# Patient Record
Sex: FEMALE | Race: WHITE | NOT HISPANIC OR LATINO | Employment: OTHER | URBAN - METROPOLITAN AREA
[De-identification: names, ages, dates, MRNs, and addresses within clinical notes are randomized per-mention and may not be internally consistent; named-entity substitution may affect disease eponyms.]

---

## 2023-03-25 SDOH — ECONOMIC STABILITY: HOUSING INSECURITY: IN THE LAST 12 MONTHS, HOW MANY PLACES HAVE YOU LIVED?: 1

## 2023-03-25 SDOH — ECONOMIC STABILITY: INCOME INSECURITY: IN THE LAST 12 MONTHS, WAS THERE A TIME WHEN YOU WERE NOT ABLE TO PAY THE MORTGAGE OR RENT ON TIME?: NO

## 2023-03-25 SDOH — HEALTH STABILITY: PHYSICAL HEALTH: ON AVERAGE, HOW MANY MINUTES DO YOU ENGAGE IN EXERCISE AT THIS LEVEL?: 0 MIN

## 2023-03-25 SDOH — ECONOMIC STABILITY: FOOD INSECURITY: WITHIN THE PAST 12 MONTHS, THE FOOD YOU BOUGHT JUST DIDN'T LAST AND YOU DIDN'T HAVE MONEY TO GET MORE.: NEVER TRUE

## 2023-03-25 SDOH — ECONOMIC STABILITY: HOUSING INSECURITY
IN THE LAST 12 MONTHS, WAS THERE A TIME WHEN YOU DID NOT HAVE A STEADY PLACE TO SLEEP OR SLEPT IN A SHELTER (INCLUDING NOW)?: NO

## 2023-03-25 SDOH — ECONOMIC STABILITY: INCOME INSECURITY: HOW HARD IS IT FOR YOU TO PAY FOR THE VERY BASICS LIKE FOOD, HOUSING, MEDICAL CARE, AND HEATING?: NOT HARD AT ALL

## 2023-03-25 SDOH — HEALTH STABILITY: PHYSICAL HEALTH: ON AVERAGE, HOW MANY DAYS PER WEEK DO YOU ENGAGE IN MODERATE TO STRENUOUS EXERCISE (LIKE A BRISK WALK)?: 0 DAYS

## 2023-03-25 SDOH — ECONOMIC STABILITY: TRANSPORTATION INSECURITY
IN THE PAST 12 MONTHS, HAS THE LACK OF TRANSPORTATION KEPT YOU FROM MEDICAL APPOINTMENTS OR FROM GETTING MEDICATIONS?: NO

## 2023-03-25 SDOH — ECONOMIC STABILITY: TRANSPORTATION INSECURITY
IN THE PAST 12 MONTHS, HAS LACK OF TRANSPORTATION KEPT YOU FROM MEETINGS, WORK, OR FROM GETTING THINGS NEEDED FOR DAILY LIVING?: NO

## 2023-03-25 SDOH — ECONOMIC STABILITY: FOOD INSECURITY: WITHIN THE PAST 12 MONTHS, YOU WORRIED THAT YOUR FOOD WOULD RUN OUT BEFORE YOU GOT MONEY TO BUY MORE.: NEVER TRUE

## 2023-03-25 SDOH — ECONOMIC STABILITY: TRANSPORTATION INSECURITY
IN THE PAST 12 MONTHS, HAS LACK OF RELIABLE TRANSPORTATION KEPT YOU FROM MEDICAL APPOINTMENTS, MEETINGS, WORK OR FROM GETTING THINGS NEEDED FOR DAILY LIVING?: NO

## 2023-03-25 SDOH — HEALTH STABILITY: MENTAL HEALTH
STRESS IS WHEN SOMEONE FEELS TENSE, NERVOUS, ANXIOUS, OR CAN'T SLEEP AT NIGHT BECAUSE THEIR MIND IS TROUBLED. HOW STRESSED ARE YOU?: PATIENT DECLINED

## 2023-03-25 ASSESSMENT — SOCIAL DETERMINANTS OF HEALTH (SDOH)
DO YOU BELONG TO ANY CLUBS OR ORGANIZATIONS SUCH AS CHURCH GROUPS UNIONS, FRATERNAL OR ATHLETIC GROUPS, OR SCHOOL GROUPS?: NO
DO YOU BELONG TO ANY CLUBS OR ORGANIZATIONS SUCH AS CHURCH GROUPS UNIONS, FRATERNAL OR ATHLETIC GROUPS, OR SCHOOL GROUPS?: NO
HOW OFTEN DO YOU ATTENT MEETINGS OF THE CLUB OR ORGANIZATION YOU BELONG TO?: PATIENT DECLINED
ARE YOU MARRIED, WIDOWED, DIVORCED, SEPARATED, NEVER MARRIED, OR LIVING WITH A PARTNER?: LIVING WITH PARTNER
IN A TYPICAL WEEK, HOW MANY TIMES DO YOU TALK ON THE PHONE WITH FAMILY, FRIENDS, OR NEIGHBORS?: PATIENT DECLINED
IN A TYPICAL WEEK, HOW MANY TIMES DO YOU TALK ON THE PHONE WITH FAMILY, FRIENDS, OR NEIGHBORS?: PATIENT DECLINED
HOW OFTEN DO YOU ATTEND CHURCH OR RELIGIOUS SERVICES?: PATIENT DECLINED
HOW OFTEN DO YOU GET TOGETHER WITH FRIENDS OR RELATIVES?: PATIENT DECLINED
ARE YOU MARRIED, WIDOWED, DIVORCED, SEPARATED, NEVER MARRIED, OR LIVING WITH A PARTNER?: LIVING WITH PARTNER
HOW OFTEN DO YOU ATTENT MEETINGS OF THE CLUB OR ORGANIZATION YOU BELONG TO?: PATIENT DECLINED
WITHIN THE PAST 12 MONTHS, YOU WORRIED THAT YOUR FOOD WOULD RUN OUT BEFORE YOU GOT THE MONEY TO BUY MORE: NEVER TRUE
HOW OFTEN DO YOU HAVE A DRINK CONTAINING ALCOHOL: MONTHLY OR LESS
HOW OFTEN DO YOU HAVE SIX OR MORE DRINKS ON ONE OCCASION: NEVER
HOW MANY DRINKS CONTAINING ALCOHOL DO YOU HAVE ON A TYPICAL DAY WHEN YOU ARE DRINKING: 1 OR 2
HOW OFTEN DO YOU GET TOGETHER WITH FRIENDS OR RELATIVES?: PATIENT DECLINED
HOW HARD IS IT FOR YOU TO PAY FOR THE VERY BASICS LIKE FOOD, HOUSING, MEDICAL CARE, AND HEATING?: NOT HARD AT ALL
HOW OFTEN DO YOU ATTEND CHURCH OR RELIGIOUS SERVICES?: PATIENT DECLINED

## 2023-03-25 ASSESSMENT — LIFESTYLE VARIABLES
SKIP TO QUESTIONS 9-10: 1
HOW MANY STANDARD DRINKS CONTAINING ALCOHOL DO YOU HAVE ON A TYPICAL DAY: 1 OR 2
AUDIT-C TOTAL SCORE: 1
HOW OFTEN DO YOU HAVE SIX OR MORE DRINKS ON ONE OCCASION: NEVER
HOW OFTEN DO YOU HAVE A DRINK CONTAINING ALCOHOL: MONTHLY OR LESS

## 2023-03-28 ENCOUNTER — OFFICE VISIT (OUTPATIENT)
Dept: MEDICAL GROUP | Facility: MEDICAL CENTER | Age: 53
End: 2023-03-28
Payer: COMMERCIAL

## 2023-03-28 VITALS
OXYGEN SATURATION: 93 % | HEART RATE: 85 BPM | SYSTOLIC BLOOD PRESSURE: 135 MMHG | HEIGHT: 69 IN | RESPIRATION RATE: 17 BRPM | BODY MASS INDEX: 37.03 KG/M2 | DIASTOLIC BLOOD PRESSURE: 90 MMHG | WEIGHT: 250 LBS | TEMPERATURE: 98.2 F

## 2023-03-28 DIAGNOSIS — R06.83 SNORING: ICD-10-CM

## 2023-03-28 DIAGNOSIS — Z12.31 ENCOUNTER FOR SCREENING MAMMOGRAM FOR BREAST CANCER: ICD-10-CM

## 2023-03-28 DIAGNOSIS — I10 ESSENTIAL HYPERTENSION: ICD-10-CM

## 2023-03-28 DIAGNOSIS — Z00.00 WELL ADULT EXAM: ICD-10-CM

## 2023-03-28 DIAGNOSIS — R42 VERTIGO: ICD-10-CM

## 2023-03-28 DIAGNOSIS — Z90.710 H/O TOTAL HYSTERECTOMY: ICD-10-CM

## 2023-03-28 DIAGNOSIS — R53.82 CHRONIC FATIGUE: ICD-10-CM

## 2023-03-28 DIAGNOSIS — Z85.41 HISTORY OF CERVICAL CANCER: ICD-10-CM

## 2023-03-28 DIAGNOSIS — Z86.711 HISTORY OF PULMONARY EMBOLUS (PE): ICD-10-CM

## 2023-03-28 DIAGNOSIS — R63.5 WEIGHT GAIN: ICD-10-CM

## 2023-03-28 DIAGNOSIS — E66.9 CLASS 2 OBESITY: ICD-10-CM

## 2023-03-28 DIAGNOSIS — Z11.59 NEED FOR HEPATITIS C SCREENING TEST: ICD-10-CM

## 2023-03-28 DIAGNOSIS — Z12.11 COLON CANCER SCREENING: ICD-10-CM

## 2023-03-28 PROBLEM — Z87.442 HISTORY OF KIDNEY STONES: Status: ACTIVE | Noted: 2018-02-09

## 2023-03-28 PROBLEM — M25.561 CHRONIC PAIN OF BOTH KNEES: Status: ACTIVE | Noted: 2017-12-19

## 2023-03-28 PROBLEM — M25.562 CHRONIC PAIN OF BOTH KNEES: Status: ACTIVE | Noted: 2017-12-19

## 2023-03-28 PROBLEM — N20.0 KIDNEY STONE: Status: ACTIVE | Noted: 2018-02-09

## 2023-03-28 PROBLEM — G89.29 CHRONIC PAIN OF BOTH KNEES: Status: ACTIVE | Noted: 2017-12-19

## 2023-03-28 PROBLEM — M13.80 ALLERGIC ARTHRITIS: Status: ACTIVE | Noted: 2017-12-05

## 2023-03-28 PROCEDURE — 99204 OFFICE O/P NEW MOD 45 MIN: CPT | Performed by: INTERNAL MEDICINE

## 2023-03-28 RX ORDER — PROCHLORPERAZINE MALEATE 10 MG
TABLET ORAL
COMMUNITY
Start: 2023-03-21 | End: 2023-06-07

## 2023-03-28 RX ORDER — FLUTICASONE PROPIONATE AND SALMETEROL 250; 50 UG/1; UG/1
POWDER RESPIRATORY (INHALATION)
COMMUNITY
Start: 2023-03-21 | End: 2023-06-07

## 2023-03-28 RX ORDER — BENZONATATE 200 MG/1
CAPSULE ORAL
COMMUNITY
Start: 2023-03-21 | End: 2023-06-07

## 2023-03-28 RX ORDER — HYDROCODONE BITARTRATE AND ACETAMINOPHEN 10; 325 MG/1; MG/1
1 TABLET ORAL EVERY 6 HOURS PRN
COMMUNITY
End: 2023-03-28

## 2023-03-28 RX ORDER — DEXTROMETHORPHAN HYDROBROMIDE AND PROMETHAZINE HYDROCHLORIDE 15; 6.25 MG/5ML; MG/5ML
SYRUP ORAL
COMMUNITY
Start: 2023-03-21 | End: 2023-03-28

## 2023-03-28 RX ORDER — ALBUTEROL SULFATE 90 UG/1
AEROSOL, METERED RESPIRATORY (INHALATION)
COMMUNITY
Start: 2023-03-21 | End: 2023-06-07

## 2023-03-28 RX ORDER — MECLIZINE HYDROCHLORIDE 25 MG/1
TABLET ORAL
COMMUNITY
Start: 2023-03-21 | End: 2023-03-28

## 2023-03-28 RX ORDER — DOXYCYCLINE 100 MG/1
CAPSULE ORAL
COMMUNITY
Start: 2023-03-21 | End: 2023-06-07

## 2023-03-28 ASSESSMENT — PATIENT HEALTH QUESTIONNAIRE - PHQ9: CLINICAL INTERPRETATION OF PHQ2 SCORE: 0

## 2023-03-28 NOTE — PROGRESS NOTES
New Patient to Establish      CC: Weight gain, fatigue    HPI:   Ariana is a 52 y.o. female who came into clinic for above.  She lives in [place].  She moved from [place] since 2019.    She is currently recovering from URI for the past 10 days.  She got doxycycline and other symptomatic treatment.    She is struggling with weight gain, 20 lbs gain since 2019.  She is feeling tired.  Her blood pressure is rising.  She would like to start changing things to improve her health.  Her current work schedule is pretty rigorous 10 to 12 hours/day and she travels a lot which affects her lifestyle.    She snores but no gasping.  She does not have good quality sleep as she wakes up often.    ROS:   As above    Patient Active Problem List    Diagnosis Date Noted    History of cervical cancer 03/28/2023    H/O total hysterectomy 03/28/2023    Vertigo 03/28/2023    History of kidney stones 02/09/2018    Chronic pain of both knees 12/19/2017    History of pulmonary embolus (PE) 12/19/2017    Allergic arthritis 12/05/2017       History reviewed. No pertinent past medical history.    Current Outpatient Medications   Medication Sig Dispense Refill    albuterol 108 (90 Base) MCG/ACT Aero Soln inhalation aerosol       benzonatate (TESSALON) 200 MG capsule       doxycycline (MONODOX) 100 MG capsule       WIXELA INHUB 250-50 MCG/ACT AEROSOL POWDER, BREATH ACTIVATED       prochlorperazine (COMPAZINE) 10 MG Tab        No current facility-administered medications for this visit.       Allergies as of 03/28/2023    (Not on File)       Social History     Socioeconomic History    Marital status: Single     Spouse name: Not on file    Number of children: Not on file    Years of education: Not on file    Highest education level: Bachelor's degree (e.g., BA, AB, BS)   Occupational History    Not on file   Tobacco Use    Smoking status: Never    Smokeless tobacco: Never   Vaping Use    Vaping Use: Never used   Substance and Sexual Activity  "   Alcohol use: Yes     Comment: Rarely    Drug use: Yes     Types: Marijuana     Comment: sleep, stress    Sexual activity: Yes     Partners: Male     Birth control/protection: None     Comment: in a relationship, no child   Other Topics Concern    Not on file   Social History Narrative    Working as chief sale officer     Social Determinants of Health     Financial Resource Strain: Low Risk     Difficulty of Paying Living Expenses: Not hard at all   Food Insecurity: No Food Insecurity    Worried About Running Out of Food in the Last Year: Never true    Ran Out of Food in the Last Year: Never true   Transportation Needs: No Transportation Needs    Lack of Transportation (Medical): No    Lack of Transportation (Non-Medical): No   Physical Activity: Inactive    Days of Exercise per Week: 0 days    Minutes of Exercise per Session: 0 min   Stress: Unknown    Feeling of Stress : Patient refused   Social Connections: Unknown    Frequency of Communication with Friends and Family: Patient refused    Frequency of Social Gatherings with Friends and Family: Patient refused    Attends Anabaptist Services: Patient refused    Active Member of Clubs or Organizations: No    Attends Club or Organization Meetings: Patient refused    Marital Status: Living with partner   Intimate Partner Violence: Not on file   Housing Stability: Low Risk     Unable to Pay for Housing in the Last Year: No    Number of Places Lived in the Last Year: 1    Unstable Housing in the Last Year: No       Family History   Family history unknown: Yes       Past Surgical History:   Procedure Laterality Date    ABDOMINAL HYSTERECTOMY TOTAL  2011    KNEE ARTHROPLASTY TOTAL Right          BP (!) 135/90   Pulse 85   Temp 36.8 °C (98.2 °F) (Temporal)   Resp 17   Ht 1.753 m (5' 9\")   Wt 113 kg (250 lb)   SpO2 93%   BMI 36.92 kg/m²     Physical Exam  General: Alert and oriented, No apparent distress.  Eyes: Pupils are equal and reactive. No scleral " icterus.  Throat: Clear no erythema or exudates noted.  Neck: Supple. No cervical or supraclavicular lymphadenopathy noted. Thyroid not enlarged.  Lungs: Clear to auscultation bilaterally without any wheezing, crepitations.  Cardiovascular: Regular rate and rhythm. No murmurs, rubs or gallops.  Abdomen: Bowel sound +, soft, non tender, no rebound or guarding, no palpable organomegaly  Extremities: No edema.  Skin: No rash or suspicious skin lesions noted.  Neuro: A & O x 4. Normal speech and memory. Motor and sensory grossly normal.       Assessment and Plan    1. Weight gain  Check for reversible cause.  Recommend to start seeing a nutritionist to work with.   - TSH WITH REFLEX TO FT4; Future    2. Class 2 obesity  - Referral to Nutrition Services  - She would like to avoid medication if possible.  Discussed to start optimizing lifestyle. We will see if there is any pressing metabolic complication though labs that will benefit from pharmacological therapy.    3. Chronic fatigue  - VITAMIN D,25 HYDROXY (DEFICIENCY); Future  - FERRITIN; Future  - IRON/TOTAL IRON BIND; Future  - VITAMIN B12; Future  - Referral to Pulmonary and Sleep Medicine    4. Snoring  - Referral to Pulmonary and Sleep Medicine    5. Essential hypertension  Okay to monitor at this point with lifestyle modification.  - Referral to Nutrition Services    6. History of cervical cancer  7. H/O total hysterectomy  Got hysterectomy in 2011. Early stage, in remission since.    8. History of pulmonary embolus (PE)  - provoked after surgery    9. Vertigo  Occasional with inner ear symptoms.    10. Need for hepatitis C screening test  - HEP C VIRUS ANTIBODY; Future    11. Well adult exam  - CBC WITH DIFFERENTIAL; Future  - Comp Metabolic Panel; Future  - Lipid Profile; Future    12. Encounter for screening mammogram for breast cancer  - MA-SCREENING MAMMO BILAT W/TOMOSYNTHESIS W/CAD; Future    13. Colon cancer screening  - Referral to GI for Colonoscopy            Followup: Return in about 3-6 months.           Signed by: Celsa Gaspar M.D.

## 2023-04-04 ENCOUNTER — TELEPHONE (OUTPATIENT)
Dept: HEALTH INFORMATION MANAGEMENT | Facility: OTHER | Age: 53
End: 2023-04-04
Payer: COMMERCIAL

## 2023-04-20 ENCOUNTER — HOSPITAL ENCOUNTER (OUTPATIENT)
Dept: RADIOLOGY | Facility: MEDICAL CENTER | Age: 53
End: 2023-04-20
Payer: COMMERCIAL

## 2023-04-24 ENCOUNTER — APPOINTMENT (OUTPATIENT)
Dept: RADIOLOGY | Facility: MEDICAL CENTER | Age: 53
End: 2023-04-24
Attending: INTERNAL MEDICINE
Payer: COMMERCIAL

## 2023-05-23 ENCOUNTER — NON-PROVIDER VISIT (OUTPATIENT)
Dept: INTERNAL MEDICINE | Facility: OTHER | Age: 53
End: 2023-05-23
Payer: COMMERCIAL

## 2023-05-23 DIAGNOSIS — G89.29 CHRONIC PAIN OF BOTH KNEES: ICD-10-CM

## 2023-05-23 DIAGNOSIS — E66.9 CLASS 2 OBESITY WITHOUT SERIOUS COMORBIDITY WITH BODY MASS INDEX (BMI) OF 38.0 TO 38.9 IN ADULT, UNSPECIFIED OBESITY TYPE: ICD-10-CM

## 2023-05-23 DIAGNOSIS — I10 HYPERTENSION, UNSPECIFIED TYPE: ICD-10-CM

## 2023-05-23 DIAGNOSIS — M25.561 CHRONIC PAIN OF BOTH KNEES: ICD-10-CM

## 2023-05-23 DIAGNOSIS — M25.562 CHRONIC PAIN OF BOTH KNEES: ICD-10-CM

## 2023-05-23 PROCEDURE — 97802 MEDICAL NUTRITION INDIV IN: CPT | Performed by: DIETITIAN, REGISTERED

## 2023-05-23 NOTE — PROGRESS NOTES
"Ariana Ferris is a 52 y.o. year old, female initial evaluation for weight management and HTN.    ROS: overweight in high school- lost 60 lbs, in a good place for many years; then health issues: knee replacement, other needs and increasing weight \"looking at food\"; Stage 1 cervical cancer- feels that hormonal changes are influencing weight; Travel, dining out challenges for work.    Wellness Vision:  I want to lose weight so that I can travel and feel comfortable on an airplane, not be out of breath, want to be healthy to prolong life.    My Health Profile:    PHYSICAL ASSESSMENT  Current Height:  68\"  Ht Readings from Last 1 Encounters:   03/28/23 1.753 m (5' 9\")     Current Weight:  252#  Wt Readings from Last 1 Encounters:   03/28/23 113 kg (250 lb)     BMI: 38    Goal Weight:  <200#    Total Body Water (lbs): 88#  Total Body Water (%): 34.9%  Visceral Fat: 15   (Range: Less than 13)  Total Body Fat: (lbs): 132#  % Body Fat: 52.4%  Muscle Mass (lbs): 114#  Muscle Mass (%): 45%  Fat-Free Mass: 120#  Resting Metabolic Rate: 1738  Weight Loss Calories: 6992-9270  Maintenance Calories: 2200    NUTRITION PHYSICAL ASSESSMENT:  Waist cm:48.75\"    FLUID INTAKE: \"horrible about drinking water\"; coffee 12 oz.halfnhalf 2 tsp  Typical Beverages: 40 oz water, no soda, juice; Kombucha for stomach problems, Iced Tea -unsweetened  Servings Alcohol/D/WK/MO: not really, a couple per month    ACTIVITY REVIEW: slim to none  Current Exercise:   Hobbies:     PERTINENT LABS:   Will be obtaining this Friday    Patient Behaviors (indicate frequency)  Meal/Snack Pattern: not a binge eater, on the road decisions cause excessive portions or lack of control; [facility] or on the road    3 meals, 1 snack    B: toast+coffee+ 1/2 Kombucha  L: turkey sandwich  D: no planning, defrosted chicken    Traveling: 3 meals, no snacks  Lunch/Dinners heavier with co-workers/clients    VF: 3-4 servings/day  Vegetables: throw in w/meals and with dinner  Fruit: " "piece of fruit w/breakfast    Plant-based proteins: mainly animal proteins    Dislikes: no peas  Dairy: string cheese, no almond milk    Dines away from Home: 3-4 /week  Locations:  [place]  Who lives in home: boyfriend, self  Additional Patient Behavior Information: boyfriend is not a good eater; shops/cooks- self mostly    PES: Obesity II, HTN r/t frequent work travel, dining out and environmental challenges as evidenced by BMI 38     NUTRITION COMMENTS:  Dianna is a 51 yo wo has struggled with weight gain with environmental challenges living w/boyfriend; Used to be savory preference in food more than sweet, and now venturing into sweets as well d/t more available in home.    Dianna feels like she knows what is healthy but \"lost her way\"     Remembers successful weight loss was through increasing PA and eating more salads while working at [employer]    Dianna feels exhausted all the time, no physical activity at this time    Recent trip to [place] ~6-weeks ago, loose stools, but remains somewhat loose. 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    Coached Dianna on her strong start and reinforced commitments made today.    RTC x next available     Time Spent: 60 minutes     "

## 2023-05-23 NOTE — PATIENT INSTRUCTIONS
"I will focus on bringing in a healthy plate or equation  - add in a fruit or a vegetable for awareness    I will unbury my stationary bike in the garage    I will analyze my environment   - add ins   - subtractions      My Health Profile:    PHYSICAL ASSESSMENT  Current Height:  68\"  Ht Readings from Last 1 Encounters:   03/28/23 1.753 m (5' 9\")     Current Weight:  252#  Wt Readings from Last 1 Encounters:   03/28/23 113 kg (250 lb)     BMI: 38    Goal Weight:  <200#    Total Body Water (lbs): 88#  Total Body Water (%): 34.9%  Visceral Fat: 15   (Range: Less than 13)  Total Body Fat: (lbs): 132#  % Body Fat: 52.4%  Muscle Mass (lbs): 114#  Muscle Mass (%): 45%  Fat-Free Mass: 120#  Resting Metabolic Rate: 1738  Weight Loss Calories: 5861-7668  Maintenance Calories: 2200    NUTRITION PHYSICAL ASSESSMENT:  Waist cm:48.75\"    "

## 2023-05-24 VITALS — HEIGHT: 68 IN | WEIGHT: 252 LBS | BODY MASS INDEX: 38.19 KG/M2

## 2023-05-26 ENCOUNTER — HOSPITAL ENCOUNTER (OUTPATIENT)
Dept: LAB | Facility: MEDICAL CENTER | Age: 53
End: 2023-05-26
Attending: INTERNAL MEDICINE
Payer: COMMERCIAL

## 2023-05-26 ENCOUNTER — APPOINTMENT (OUTPATIENT)
Dept: RADIOLOGY | Facility: MEDICAL CENTER | Age: 53
End: 2023-05-26
Attending: INTERNAL MEDICINE
Payer: COMMERCIAL

## 2023-05-26 DIAGNOSIS — R53.82 CHRONIC FATIGUE: ICD-10-CM

## 2023-05-26 DIAGNOSIS — R73.01 FASTING HYPERGLYCEMIA: ICD-10-CM

## 2023-05-26 DIAGNOSIS — Z12.31 ENCOUNTER FOR SCREENING MAMMOGRAM FOR BREAST CANCER: ICD-10-CM

## 2023-05-26 DIAGNOSIS — Z11.59 NEED FOR HEPATITIS C SCREENING TEST: ICD-10-CM

## 2023-05-26 DIAGNOSIS — R63.5 WEIGHT GAIN: ICD-10-CM

## 2023-05-26 DIAGNOSIS — R79.89 LOW VITAMIN D LEVEL: ICD-10-CM

## 2023-05-26 DIAGNOSIS — Z00.00 WELL ADULT EXAM: ICD-10-CM

## 2023-05-26 DIAGNOSIS — E78.2 MIXED HYPERLIPIDEMIA: ICD-10-CM

## 2023-05-26 LAB
25(OH)D3 SERPL-MCNC: 20 NG/ML (ref 30–100)
ALBUMIN SERPL BCP-MCNC: 4.2 G/DL (ref 3.2–4.9)
ALBUMIN/GLOB SERPL: 1.4 G/DL
ALP SERPL-CCNC: 85 U/L (ref 30–99)
ALT SERPL-CCNC: 40 U/L (ref 2–50)
ANION GAP SERPL CALC-SCNC: 14 MMOL/L (ref 7–16)
AST SERPL-CCNC: 29 U/L (ref 12–45)
BASOPHILS # BLD AUTO: 0.8 % (ref 0–1.8)
BASOPHILS # BLD: 0.07 K/UL (ref 0–0.12)
BILIRUB SERPL-MCNC: 0.5 MG/DL (ref 0.1–1.5)
BUN SERPL-MCNC: 16 MG/DL (ref 8–22)
CALCIUM ALBUM COR SERPL-MCNC: 9.1 MG/DL (ref 8.5–10.5)
CALCIUM SERPL-MCNC: 9.3 MG/DL (ref 8.5–10.5)
CHLORIDE SERPL-SCNC: 104 MMOL/L (ref 96–112)
CHOLEST SERPL-MCNC: 246 MG/DL (ref 100–199)
CO2 SERPL-SCNC: 21 MMOL/L (ref 20–33)
CREAT SERPL-MCNC: 0.7 MG/DL (ref 0.5–1.4)
EOSINOPHIL # BLD AUTO: 0.19 K/UL (ref 0–0.51)
EOSINOPHIL NFR BLD: 2.1 % (ref 0–6.9)
ERYTHROCYTE [DISTWIDTH] IN BLOOD BY AUTOMATED COUNT: 41.6 FL (ref 35.9–50)
EST. AVERAGE GLUCOSE BLD GHB EST-MCNC: 131 MG/DL
FERRITIN SERPL-MCNC: 228 NG/ML (ref 10–291)
GFR SERPLBLD CREATININE-BSD FMLA CKD-EPI: 104 ML/MIN/1.73 M 2
GLOBULIN SER CALC-MCNC: 3.1 G/DL (ref 1.9–3.5)
GLUCOSE SERPL-MCNC: 130 MG/DL (ref 65–99)
HBA1C MFR BLD: 6.2 % (ref 4–5.6)
HCT VFR BLD AUTO: 44.1 % (ref 37–47)
HCV AB SER QL: NORMAL
HDLC SERPL-MCNC: 34 MG/DL
HGB BLD-MCNC: 14.4 G/DL (ref 12–16)
IMM GRANULOCYTES # BLD AUTO: 0.04 K/UL (ref 0–0.11)
IMM GRANULOCYTES NFR BLD AUTO: 0.4 % (ref 0–0.9)
IRON SATN MFR SERPL: 32 % (ref 15–55)
IRON SERPL-MCNC: 90 UG/DL (ref 40–170)
LDLC SERPL CALC-MCNC: 148 MG/DL
LYMPHOCYTES # BLD AUTO: 4.03 K/UL (ref 1–4.8)
LYMPHOCYTES NFR BLD: 45 % (ref 22–41)
MCH RBC QN AUTO: 29.9 PG (ref 27–33)
MCHC RBC AUTO-ENTMCNC: 32.7 G/DL (ref 32.2–35.5)
MCV RBC AUTO: 91.5 FL (ref 81.4–97.8)
MONOCYTES # BLD AUTO: 0.56 K/UL (ref 0–0.85)
MONOCYTES NFR BLD AUTO: 6.3 % (ref 0–13.4)
NEUTROPHILS # BLD AUTO: 4.07 K/UL (ref 1.82–7.42)
NEUTROPHILS NFR BLD: 45.4 % (ref 44–72)
NRBC # BLD AUTO: 0 K/UL
NRBC BLD-RTO: 0 /100 WBC (ref 0–0.2)
PLATELET # BLD AUTO: 307 K/UL (ref 164–446)
PMV BLD AUTO: 9.8 FL (ref 9–12.9)
POTASSIUM SERPL-SCNC: 4 MMOL/L (ref 3.6–5.5)
PROT SERPL-MCNC: 7.3 G/DL (ref 6–8.2)
RBC # BLD AUTO: 4.82 M/UL (ref 4.2–5.4)
SODIUM SERPL-SCNC: 139 MMOL/L (ref 135–145)
TIBC SERPL-MCNC: 282 UG/DL (ref 250–450)
TRIGL SERPL-MCNC: 319 MG/DL (ref 0–149)
TSH SERPL DL<=0.005 MIU/L-ACNC: 3.75 UIU/ML (ref 0.38–5.33)
UIBC SERPL-MCNC: 192 UG/DL (ref 110–370)
VIT B12 SERPL-MCNC: 339 PG/ML (ref 211–911)
WBC # BLD AUTO: 9 K/UL (ref 4.8–10.8)

## 2023-05-26 PROCEDURE — 77063 BREAST TOMOSYNTHESIS BI: CPT

## 2023-05-26 PROCEDURE — 80053 COMPREHEN METABOLIC PANEL: CPT

## 2023-05-26 PROCEDURE — 83540 ASSAY OF IRON: CPT

## 2023-05-26 PROCEDURE — 85025 COMPLETE CBC W/AUTO DIFF WBC: CPT

## 2023-05-26 PROCEDURE — 82728 ASSAY OF FERRITIN: CPT

## 2023-05-26 PROCEDURE — 82306 VITAMIN D 25 HYDROXY: CPT

## 2023-05-26 PROCEDURE — 83550 IRON BINDING TEST: CPT

## 2023-05-26 PROCEDURE — 82607 VITAMIN B-12: CPT

## 2023-05-26 PROCEDURE — 86803 HEPATITIS C AB TEST: CPT

## 2023-05-26 PROCEDURE — 80061 LIPID PANEL: CPT

## 2023-05-26 PROCEDURE — 36415 COLL VENOUS BLD VENIPUNCTURE: CPT

## 2023-05-26 PROCEDURE — 83036 HEMOGLOBIN GLYCOSYLATED A1C: CPT

## 2023-05-26 PROCEDURE — 84443 ASSAY THYROID STIM HORMONE: CPT

## 2023-06-07 ENCOUNTER — TELEMEDICINE (OUTPATIENT)
Dept: MEDICAL GROUP | Facility: MEDICAL CENTER | Age: 53
End: 2023-06-07
Payer: COMMERCIAL

## 2023-06-07 VITALS — BODY MASS INDEX: 37.59 KG/M2 | HEIGHT: 68 IN | WEIGHT: 248 LBS

## 2023-06-07 DIAGNOSIS — R73.03 PREDIABETES: ICD-10-CM

## 2023-06-07 DIAGNOSIS — E66.9 CLASS 2 OBESITY: ICD-10-CM

## 2023-06-07 DIAGNOSIS — E78.2 MIXED HYPERLIPIDEMIA: ICD-10-CM

## 2023-06-07 PROCEDURE — 99214 OFFICE O/P EST MOD 30 MIN: CPT | Mod: 95 | Performed by: INTERNAL MEDICINE

## 2023-06-07 RX ORDER — SEMAGLUTIDE 0.25 MG/.5ML
0.25 INJECTION, SOLUTION SUBCUTANEOUS
Qty: 4 EACH | Refills: 0 | Status: SHIPPED
Start: 2023-06-07 | End: 2023-07-07

## 2023-06-07 ASSESSMENT — FIBROSIS 4 INDEX: FIB4 SCORE: 0.76

## 2023-06-07 NOTE — PROGRESS NOTES
"Virtual Visit: Established Patient   This visit was conducted via Zoom using secure and encrypted videoconferencing technology. The patient was in a private location in the state of [place].    The patient's identity was confirmed and verbal consent was obtained for this virtual visit.    Subjective:   CC:   Chief Complaint   Patient presents with    Other     Discuss weight loss and possible Medication.     Follow-Up       Ariana Ferris is a 52 y.o. female presenting for evaluation and management of above:    She met with a nutritionist and has started working on the diet.  She has lost 3 pounds.  However, she does not feel confident enough that lifestyle modification alone will take care of her prediabetes and high cholesterol.  She does not want to end up in a worse situation because she does not start medication aggressively.   Currently, she would like to get some support with medication for weight loss, at least temporarily.    ROS    As above    Not on File    Current medicines (including changes today)  Current Outpatient Medications   Medication Sig Dispense Refill    Semaglutide (WEGOVY) 0.25 MG/0.5ML Solution Auto-injector Pen-injector Inject 0.5 mL under the skin every 7 days. 4 Each 0     No current facility-administered medications for this visit.       Patient Active Problem List    Diagnosis Date Noted    History of cervical cancer 03/28/2023    H/O total hysterectomy 03/28/2023    Vertigo 03/28/2023    History of kidney stones 02/09/2018    Chronic pain of both knees 12/19/2017    History of pulmonary embolus (PE) 12/19/2017    Allergic arthritis 12/05/2017          Objective:   Ht 1.727 m (5' 8\")   Wt 112 kg (248 lb)   BMI 37.71 kg/m²     Physical Exam:   Constitutional: Alert, no distress, well-groomed.  Skin: No rashes in visible areas.  Eye: Round. Conjunctiva clear, lids normal. No icterus.   ENMT: Lips pink without lesions, moist mucous membranes. Phonation normal.  Neck: No visible masses "   Respiratory: Unlabored respiratory effort, no cough or audible wheeze  Psych: Alert and oriented x3, normal affect and mood.       Assessment and Plan:   The following treatment plan was discussed:     1. Class 2 obesity  Discussed options for Wegovy, phentermine, Contrave.  Ideally, she is a perfect fit for Wegovy due to prediabetes and significant target weight loss (50 lb).   - Possible GI side effects, theoretical risk for pancreatitis discussed.  No contraindication. Sent starting dose.  - Discussed about possible alternative if there is no coverage with Wegovy.  Phentermine with close monitoring of heart rate, blood pressure, dry mouth.  Contrave (Wellbutrin plus naltrexone as generic alternatives) with possible nausea.     2. Prediabetes  3. Mixed hyperlipidemia  Discussed that this is not going to get worse as she already started lifestyle modification. These should significantly improve as long as her weight is progressing as expected.      Follow-up: Return in about 3 months (around 9/7/2023), or if symptoms worsen or fail to improve.

## 2023-06-08 ENCOUNTER — PATIENT MESSAGE (OUTPATIENT)
Dept: MEDICAL GROUP | Facility: MEDICAL CENTER | Age: 53
End: 2023-06-08
Payer: COMMERCIAL

## 2023-06-08 DIAGNOSIS — E66.9 CLASS 2 OBESITY: ICD-10-CM

## 2023-06-09 RX ORDER — PHENTERMINE HYDROCHLORIDE 15 MG/1
15 CAPSULE ORAL EVERY MORNING
Qty: 30 CAPSULE | Refills: 0 | Status: SHIPPED | OUTPATIENT
Start: 2023-06-09 | End: 2023-07-07 | Stop reason: SDUPTHER

## 2023-07-07 ENCOUNTER — OFFICE VISIT (OUTPATIENT)
Dept: MEDICAL GROUP | Facility: MEDICAL CENTER | Age: 53
End: 2023-07-07
Payer: COMMERCIAL

## 2023-07-07 VITALS
TEMPERATURE: 97.7 F | DIASTOLIC BLOOD PRESSURE: 80 MMHG | SYSTOLIC BLOOD PRESSURE: 130 MMHG | WEIGHT: 239 LBS | RESPIRATION RATE: 16 BRPM | OXYGEN SATURATION: 97 % | HEIGHT: 69 IN | BODY MASS INDEX: 35.4 KG/M2 | HEART RATE: 80 BPM

## 2023-07-07 DIAGNOSIS — M54.50 ACUTE BILATERAL LOW BACK PAIN WITHOUT SCIATICA: ICD-10-CM

## 2023-07-07 DIAGNOSIS — E66.9 CLASS 2 OBESITY: ICD-10-CM

## 2023-07-07 DIAGNOSIS — J34.0 NASAL SEPTUM ULCERATION: ICD-10-CM

## 2023-07-07 PROCEDURE — 3079F DIAST BP 80-89 MM HG: CPT | Performed by: INTERNAL MEDICINE

## 2023-07-07 PROCEDURE — 3075F SYST BP GE 130 - 139MM HG: CPT | Performed by: INTERNAL MEDICINE

## 2023-07-07 PROCEDURE — 99214 OFFICE O/P EST MOD 30 MIN: CPT | Performed by: INTERNAL MEDICINE

## 2023-07-07 RX ORDER — PHENTERMINE HYDROCHLORIDE 15 MG/1
15 CAPSULE ORAL EVERY MORNING
Qty: 30 CAPSULE | Refills: 0 | Status: SHIPPED | OUTPATIENT
Start: 2023-07-07 | End: 2023-08-04

## 2023-07-07 ASSESSMENT — FIBROSIS 4 INDEX: FIB4 SCORE: 0.76

## 2023-07-08 NOTE — PROGRESS NOTES
"    Established Patient    Ariana presents today with the following:    CC:  back pain, weight management    HPI:   Ariana is a 52 y.o. female who came in for above.    She developed pain after doing at work on Sunday.  It is bilateral lower back pain which is constant.  No tingling numbness or weakness in lower legs.    She continues to work with a nutritionist and is responding well to phentermine.  She has dry mouth but manageable.      ROS:   As above    Patient Active Problem List    Diagnosis Date Noted    History of cervical cancer 03/28/2023    H/O total hysterectomy 03/28/2023    Vertigo 03/28/2023    History of kidney stones 02/09/2018    Chronic pain of both knees 12/19/2017    History of pulmonary embolus (PE) 12/19/2017    Allergic arthritis 12/05/2017       Current Outpatient Medications   Medication Sig Dispense Refill    phentermine 15 MG capsule Take 1 Capsule by mouth every morning for 30 days. 30 Capsule 0     No current facility-administered medications for this visit.         /80 (Patient Position: Sitting)   Pulse 80   Temp 36.5 °C (97.7 °F) (Temporal)   Resp 16   Ht 1.753 m (5' 9\")   Wt 108 kg (239 lb)   SpO2 97%   BMI 35.29 kg/m²     Physical Exam  General: Alert and oriented, No apparent distress.  Neck: Supple. No cervical or supraclavicular lymphadenopathy noted. Thyroid not enlarged.  Lungs: Clear to auscultation bilaterally without any wheezing, crepitations.  Cardiovascular: Regular rate and rhythm. No murmurs, rubs or gallops.  Abdomen: Bowel sound +, soft, non tender, no rebound or guarding, no palpable organomegaly  Extremities: No edema.      Assessment and Plan    1. Acute bilateral low back pain without sciatica  No worrisome features.  Continue to mobilize regularly.  Ice twice a day.  If it is not better in 2 weeks, recommend physical therapy.    2. Class 2 obesity  Lost 12 pounds in the first month. Satisfactory response to phentermine Continue current dose.   " reviewed.  - phentermine 15 MG capsule; Take 1 Capsule by mouth every morning for 30 days.  Dispense: 30 Capsule; Refill: 0    3. Nasal septum ulceration  She developed scab which is not healing well.  Tiny on the right nasal septum without active bleeding.  Recommended Vaseline nightly for 2 weeks.  Nasal turbinate edema present on both sides.  Recommended Flonase for at least 2 weeks.    Follow-up in 1 month.    Signed by: Celsa Gaspar M.D.

## 2023-07-10 ENCOUNTER — APPOINTMENT (OUTPATIENT)
Dept: INTERNAL MEDICINE | Facility: OTHER | Age: 53
End: 2023-07-10
Payer: COMMERCIAL

## 2023-08-04 ENCOUNTER — OFFICE VISIT (OUTPATIENT)
Dept: MEDICAL GROUP | Facility: MEDICAL CENTER | Age: 53
End: 2023-08-04
Payer: COMMERCIAL

## 2023-08-04 VITALS
HEIGHT: 69 IN | TEMPERATURE: 98 F | WEIGHT: 233 LBS | DIASTOLIC BLOOD PRESSURE: 82 MMHG | HEART RATE: 83 BPM | SYSTOLIC BLOOD PRESSURE: 118 MMHG | OXYGEN SATURATION: 96 % | BODY MASS INDEX: 34.51 KG/M2

## 2023-08-04 DIAGNOSIS — L98.9 SKIN LESION: ICD-10-CM

## 2023-08-04 DIAGNOSIS — E66.9 CLASS 2 OBESITY: ICD-10-CM

## 2023-08-04 DIAGNOSIS — Z12.83 SKIN CANCER SCREENING: ICD-10-CM

## 2023-08-04 PROCEDURE — 3079F DIAST BP 80-89 MM HG: CPT | Performed by: INTERNAL MEDICINE

## 2023-08-04 PROCEDURE — 99213 OFFICE O/P EST LOW 20 MIN: CPT | Performed by: INTERNAL MEDICINE

## 2023-08-04 PROCEDURE — 3074F SYST BP LT 130 MM HG: CPT | Performed by: INTERNAL MEDICINE

## 2023-08-04 RX ORDER — PHENTERMINE HYDROCHLORIDE 37.5 MG/1
37.5 TABLET ORAL
Qty: 30 TABLET | Refills: 0 | Status: SHIPPED | OUTPATIENT
Start: 2023-08-04 | End: 2023-09-03

## 2023-08-04 ASSESSMENT — FIBROSIS 4 INDEX: FIB4 SCORE: 0.76

## 2023-08-04 NOTE — PROGRESS NOTES
"    Established Patient    Ariana presents today with the following:    CC: Weight management, skin lesion    HPI:   Ariana is a 52 y.o. female who came in for above.    She lost 5 lbs in the past month.  She was having a hard time controlling diet with a lot of traveling.    She has a skin lesion on left dorsal hand that breaks repetitively. Would like to see derm.      ROS:   As above    Patient Active Problem List    Diagnosis Date Noted    History of cervical cancer 03/28/2023    H/O total hysterectomy 03/28/2023    Vertigo 03/28/2023    History of kidney stones 02/09/2018    Chronic pain of both knees 12/19/2017    History of pulmonary embolus (PE) 12/19/2017    Allergic arthritis 12/05/2017       Current Outpatient Medications   Medication Sig Dispense Refill    phentermine (ADIPEX-P) 37.5 MG tablet Take 1 Tablet by mouth every morning before breakfast for 30 days. 30 Tablet 0     No current facility-administered medications for this visit.         /82   Pulse 83   Temp 36.7 °C (98 °F) (Temporal)   Ht 1.753 m (5' 9\")   Wt 106 kg (233 lb)   SpO2 96%   BMI 34.41 kg/m²     Physical Exam  General: Alert and oriented, No apparent distress.  Neck: Supple. No cervical or supraclavicular lymphadenopathy noted. Thyroid not enlarged.  Lungs: Clear to auscultation bilaterally without any wheezing, crepitations.  Cardiovascular: Regular rate and rhythm. No murmurs, rubs or gallops.  Extremities: trace edema.      Assessment and Plan    1. Class 2 obesity   reviewed. Having satisfactory response to phentermine 15 mg daily. 10% loss so far from baseline in conjunction with lifestyle modification. Still has dry mouth that is tolerable. Sometimes having night time craving.  Discussed option to increase phentermine to 37.5 mg daily or doing premeal lower dose TID. She would like to continue once daily dosing.   - phentermine (ADIPEX-P) 37.5 MG tablet; Take 1 Tablet by mouth every morning before breakfast for 30 " days.  Dispense: 30 Tablet; Refill: 0    2. Skin lesion  - Referral to Dermatology    3. Skin cancer screening  - Referral to Dermatology        Return in about 1 month (around 9/4/2023).           Signed by: Celsa Gaspar M.D.

## 2023-08-31 ENCOUNTER — TELEMEDICINE (OUTPATIENT)
Dept: MEDICAL GROUP | Facility: MEDICAL CENTER | Age: 53
End: 2023-08-31
Payer: COMMERCIAL

## 2023-08-31 VITALS — WEIGHT: 219 LBS | HEIGHT: 69 IN | BODY MASS INDEX: 32.44 KG/M2

## 2023-08-31 DIAGNOSIS — U07.1 COVID-19: ICD-10-CM

## 2023-08-31 DIAGNOSIS — E66.9 CLASS 2 OBESITY: ICD-10-CM

## 2023-08-31 PROCEDURE — 99213 OFFICE O/P EST LOW 20 MIN: CPT | Performed by: PHYSICIAN ASSISTANT

## 2023-08-31 RX ORDER — DEXTROMETHORPHAN HYDROBROMIDE AND PROMETHAZINE HYDROCHLORIDE 15; 6.25 MG/5ML; MG/5ML
SYRUP ORAL
Qty: 180 ML | Refills: 0 | Status: SHIPPED | OUTPATIENT
Start: 2023-08-31 | End: 2023-12-12

## 2023-08-31 ASSESSMENT — FIBROSIS 4 INDEX: FIB4 SCORE: 0.78

## 2023-09-11 ENCOUNTER — APPOINTMENT (OUTPATIENT)
Dept: INTERNAL MEDICINE | Facility: OTHER | Age: 53
End: 2023-09-11
Payer: COMMERCIAL

## 2023-09-13 ENCOUNTER — TELEMEDICINE (OUTPATIENT)
Dept: MEDICAL GROUP | Facility: MEDICAL CENTER | Age: 53
End: 2023-09-13
Payer: COMMERCIAL

## 2023-09-13 VITALS — HEIGHT: 69 IN | WEIGHT: 212 LBS | BODY MASS INDEX: 31.4 KG/M2

## 2023-09-13 DIAGNOSIS — R09.81 NASAL CONGESTION: ICD-10-CM

## 2023-09-13 DIAGNOSIS — E66.9 CLASS 2 OBESITY: ICD-10-CM

## 2023-09-13 PROCEDURE — 99214 OFFICE O/P EST MOD 30 MIN: CPT | Performed by: INTERNAL MEDICINE

## 2023-09-13 RX ORDER — AZELASTINE 1 MG/ML
2 SPRAY, METERED NASAL EVERY MORNING
Qty: 30 ML | Refills: 0 | Status: SHIPPED | OUTPATIENT
Start: 2023-09-13 | End: 2023-12-12

## 2023-09-13 RX ORDER — PHENTERMINE HYDROCHLORIDE 37.5 MG/1
37.5 TABLET ORAL
Qty: 90 TABLET | Refills: 0 | Status: SHIPPED | OUTPATIENT
Start: 2023-09-13 | End: 2023-12-12 | Stop reason: SDUPTHER

## 2023-09-13 ASSESSMENT — FIBROSIS 4 INDEX: FIB4 SCORE: 0.78

## 2023-09-13 NOTE — PROGRESS NOTES
Virtual Visit: Established Patient   This visit was conducted via Zoom using secure and encrypted videoconferencing technology. The patient was in a private location in the state of [place].    The patient's identity was confirmed and verbal consent was obtained for this virtual visit.    Subjective:   CC:   Chief Complaint   Patient presents with    Follow-Up    Medication Refill       Ariana Ferris is a 53 y.o. female presenting for evaluation and management of above:    She is doing well with increased dose of phentermine.  Tolerating without side effects.     She lost 20 pounds in the last month contributed by being sick with covid.  She tried to get antiviral but did not go well due to med being out of stock.  She is recovering but some nasal congestion left.  She still uses cough medication at night.    She is using Sudafed, Flonase, sinus rinse.    ROS    As above    No Known Allergies    Current medicines (including changes today)  Current Outpatient Medications   Medication Sig Dispense Refill    phentermine (ADIPEX-P) 37.5 MG tablet Take 1 Tablet by mouth every morning before breakfast for 90 days. 90 Tablet 0    azelastine (ASTELIN) 137 MCG/SPRAY nasal spray Administer 2 Sprays into affected nostril(S) every morning. 30 mL 0    Nirmatrelvir&Ritonavir 300/100 20 x 150 MG & 10 x 100MG Tablet Therapy Pack Take 300 mg nirmatrelvir (two 150 mg tablets) with 100 mg ritonavir (one 100 mg tablet) by mouth, with all three tablets taken together twice daily for 5 days. 30 Each 0    promethazine-dextromethorphan (PROMETHAZINE-DM) 6.25-15 MG/5ML syrup Take 5mls every six hours as needed for cough 180 mL 0     No current facility-administered medications for this visit.       Patient Active Problem List    Diagnosis Date Noted    COVID-19 08/31/2023    History of cervical cancer 03/28/2023    H/O total hysterectomy 03/28/2023    Vertigo 03/28/2023    History of kidney stones 02/09/2018    Chronic pain of both knees  "12/19/2017    History of pulmonary embolus (PE) 12/19/2017    Allergic arthritis 12/05/2017          Objective:   Ht 1.753 m (5' 9\")   Wt 96.2 kg (212 lb)   BMI 31.31 kg/m²     Physical Exam:   Constitutional: Alert, no distress, well-groomed.  Skin: No rashes in visible areas.  Eye: Round. Conjunctiva clear, lids normal. No icterus.   ENMT: Lips pink without lesions, moist mucous membranes. Phonation nasal.  Neck: No visible masses   Respiratory: Unlabored respiratory effort, no cough or audible wheeze  Psych: Alert and oriented x3, normal affect and mood.       Assessment and Plan:   The following treatment plan was discussed:     1. Class 2 obesity   reviewed.  Continue phentermine at this max dose for the next 3 months. 14% loss (start wt 248 lb)  - phentermine (ADIPEX-P) 37.5 MG tablet; Take 1 Tablet by mouth every morning before breakfast for 90 days.  Dispense: 90 Tablet; Refill: 0    2. Nasal congestion  Recommend nighttime sinus irrigation, followed by Flonase and Zyrtec.  Use Astelin in the morning.  - azelastine (ASTELIN) 137 MCG/SPRAY nasal spray; Administer 2 Sprays into affected nostril(S) every morning.  Dispense: 30 mL; Refill: 0        Follow-up: Return in about 3 months (around 12/13/2023).         "

## 2023-10-30 ENCOUNTER — PATIENT MESSAGE (OUTPATIENT)
Dept: MEDICAL GROUP | Facility: MEDICAL CENTER | Age: 53
End: 2023-10-30
Payer: COMMERCIAL

## 2023-10-30 DIAGNOSIS — G89.29 CHRONIC PAIN OF RIGHT KNEE: ICD-10-CM

## 2023-10-30 DIAGNOSIS — M25.561 CHRONIC PAIN OF RIGHT KNEE: ICD-10-CM

## 2023-11-20 ENCOUNTER — NON-PROVIDER VISIT (OUTPATIENT)
Dept: INTERNAL MEDICINE | Facility: OTHER | Age: 53
End: 2023-11-20
Payer: COMMERCIAL

## 2023-11-20 VITALS — BODY MASS INDEX: 30.62 KG/M2 | WEIGHT: 202 LBS | HEIGHT: 68 IN

## 2023-11-20 DIAGNOSIS — E66.9 OBESITY (BMI 30-39.9): ICD-10-CM

## 2023-11-20 PROCEDURE — 97803 MED NUTRITION INDIV SUBSEQ: CPT | Performed by: DIETITIAN, REGISTERED

## 2023-11-20 ASSESSMENT — FIBROSIS 4 INDEX: FIB4 SCORE: 0.78

## 2023-11-20 NOTE — PATIENT INSTRUCTIONS
"I will keep my food perspective in check and maintained    I will start looking at moving more  - spare room change to exercise room  - start with twice a week for strength and stretching exercises  - walk 3/7 days during the week w/ boyfriend!  - find my new Rx or appointment- consistency is my friend!            My Health Profile:     PHYSICAL ASSESSMENT  Current Height:  68\"      Ht Readings from Last 1 Encounters:   03/28/23 1.753 m (5' 9\")      Current Weight:  202#      Wt Readings from Last 1 Encounters:   03/28/23 113 kg (250 lb)      BMI: 30     Goal Weight:  <200#     Total Body Water (lbs): 75.4#  Total Body Water (%): 37.3%  Visceral Fat: 11   (Range: Less than 13)  Total Body Fat: (lbs): 97#  % Body Fat: 48%  Muscle Mass (lbs): 99.6#  Muscle Mass (%): 49%  Fat-Free Mass: 105#  Resting Metabolic Rate: 1500  Weight Loss Calories: 5784-3997 calories  Maintenance Calories: 2000  Protein needs: 90 grams protein per day     NUTRITION PHYSICAL ASSESSMENT:  Waist cm:41.5\"(down 7.25\")  "

## 2023-11-20 NOTE — PROGRESS NOTES
"Ariana Ferris is a 53 y.o. year old, female returns for weight loss follow up.    Positive changes since last visit:  [place] a lot since May- greater awareness of her health habits and how to travel with dining out occurrences    Significant other inspired to get off added sugar beverages and foods- seeing 20 lb weight loss himself and more likely to support her desire to be more active    Realized she loves Chinese cucumbers; cooking more at home, using lavash bread in varied ways; keeping healthy foods accessible in her kitchen.    Eating more vegetables than she has ever- now adding in the variety to prevent boredom; creative in her decisions; WFP[employer] focused    Down 54.5 lbs since May; 2 lbs , blood pressure down, planning to get new labs    Started phentermine in June- seeing benefit with curtailing cravings; understands how to adjust with her lifestyle medicine practices.    Meal/Eating/Environment Pattern:    Dianna has been active in her understanding of how to support her nourishment with medication affects of appetite suppressant - knowing how to eat vs. Not eating at all    Dianna desires to find ways to move more especially with significant weight loss and slight gain in muscle mass.    My Health Profile:     PHYSICAL ASSESSMENT  Current Height:  68\"      Ht Readings from Last 1 Encounters:   03/28/23 1.753 m (5' 9\")      Current Weight:  202#      Wt Readings from Last 1 Encounters:   03/28/23 113 kg (250 lb)      BMI: 30     Goal Weight:  <200#     Total Body Water (lbs): 75.4#  Total Body Water (%): 37.3%  Visceral Fat: 11   (Range: Less than 13)  Total Body Fat: (lbs): 97#  % Body Fat: 48%  Muscle Mass (lbs): 99.6#  Muscle Mass (%): 49%  Fat-Free Mass: 105#  Resting Metabolic Rate: 1500  Weight Loss Calories: 6190-5828 calories  Maintenance Calories: 2000  Protein needs: 90 grams protein per day     NUTRITION PHYSICAL ASSESSMENT:  Waist cm:41.5\"(down 7.25\")    Comments:  Dianna intentionally planning, " adding in whole foods into her daily eating pattern, focused on 1/2 plate egetables; less breads, pasta, pizza; choosing zoodles and spaghetti squash; able to portion control on foods that she knows should be less quantity for her weight management.    Dianna is feeling the difference in her health habits: can walk through airport with greater distance and not out of breath, feels the difference in eating healthier with her energy level. Adjusts phentermine up/down at varying times of day to benefit her weight loss management.      RTC x next available    Time Spent:  60 minutes

## 2023-12-12 ENCOUNTER — OFFICE VISIT (OUTPATIENT)
Dept: MEDICAL GROUP | Facility: MEDICAL CENTER | Age: 53
End: 2023-12-12
Payer: COMMERCIAL

## 2023-12-12 VITALS
BODY MASS INDEX: 28.94 KG/M2 | SYSTOLIC BLOOD PRESSURE: 110 MMHG | HEIGHT: 69 IN | DIASTOLIC BLOOD PRESSURE: 70 MMHG | OXYGEN SATURATION: 96 % | TEMPERATURE: 97.4 F | WEIGHT: 195.4 LBS | HEART RATE: 90 BPM

## 2023-12-12 DIAGNOSIS — R73.03 PREDIABETES: ICD-10-CM

## 2023-12-12 DIAGNOSIS — E55.9 VITAMIN D DEFICIENCY: ICD-10-CM

## 2023-12-12 DIAGNOSIS — L65.9 HAIR LOSS: ICD-10-CM

## 2023-12-12 DIAGNOSIS — E66.9 CLASS 2 OBESITY: ICD-10-CM

## 2023-12-12 DIAGNOSIS — E78.2 MIXED HYPERLIPIDEMIA: ICD-10-CM

## 2023-12-12 PROCEDURE — 3078F DIAST BP <80 MM HG: CPT | Performed by: INTERNAL MEDICINE

## 2023-12-12 PROCEDURE — 3074F SYST BP LT 130 MM HG: CPT | Performed by: INTERNAL MEDICINE

## 2023-12-12 PROCEDURE — 99214 OFFICE O/P EST MOD 30 MIN: CPT | Performed by: INTERNAL MEDICINE

## 2023-12-12 RX ORDER — PHENTERMINE HYDROCHLORIDE 37.5 MG/1
37.5 TABLET ORAL
Qty: 90 TABLET | Refills: 0 | Status: SHIPPED | OUTPATIENT
Start: 2023-12-12 | End: 2024-03-11

## 2023-12-12 ASSESSMENT — FIBROSIS 4 INDEX: FIB4 SCORE: 0.78

## 2023-12-12 NOTE — PROGRESS NOTES
"    Established Patient    Ariana presents today with the following:    CC: Weight management, follow-up for chronic medical problems    HPI:   Ariana is a 53 y.o. female who came in for above.    She is doing great.  She lost additional 15 pounds in the last 3 months with phentermine and lifestyle modification.  She started exercising, strength training and walking which helps. She quitted her job so it has been stressful since last week.         ROS:   As above    Patient Active Problem List    Diagnosis Date Noted    COVID-19 08/31/2023    History of cervical cancer 03/28/2023    H/O total hysterectomy 03/28/2023    Vertigo 03/28/2023    History of kidney stones 02/09/2018    Chronic pain of both knees 12/19/2017    History of pulmonary embolus (PE) 12/19/2017    Allergic arthritis 12/05/2017       Current Outpatient Medications   Medication Sig Dispense Refill    phentermine (ADIPEX-P) 37.5 MG tablet Take 1 Tablet by mouth every morning before breakfast for 90 days. 90 Tablet 0    meloxicam (MOBIC) 15 MG tablet Take 1 Tablet by mouth every day. 30 Tablet 0     No current facility-administered medications for this visit.         /70 (BP Location: Left arm, Patient Position: Sitting, BP Cuff Size: Adult)   Pulse 90   Temp 36.3 °C (97.4 °F) (Temporal)   Ht 1.753 m (5' 9\")   Wt 88.6 kg (195 lb 6.4 oz)   SpO2 96%   BMI 28.86 kg/m²     Physical Exam  General: Alert and oriented, No apparent distress.  Neck: Supple. No cervical or supraclavicular lymphadenopathy noted. Thyroid not enlarged.  Lungs: Clear to auscultation bilaterally without any wheezing, crepitations.  Cardiovascular: Regular rate and rhythm. No murmurs, rubs or gallops.  Abdomen: Bowel sound +, soft, non tender, no rebound or guarding, no palpable organomegaly  Extremities: No edema.      Assessment and Plan    1. Class 2 obesity  She is doing well with phentermine and lifestyle management.  She lost 24%, 61 lbs in the last 6 months. She " continues to lose about 2 lbs / week with occasional slowing down.  She continues to tolerate phentermine with some dry mouth which is manageable by hydration.   reviewed.  Refilled phentermine.  - phentermine (ADIPEX-P) 37.5 MG tablet; Take 1 Tablet by mouth every morning before breakfast for 90 days.  Dispense: 90 Tablet; Refill: 0    2. Prediabetes  3. Mixed hyperlipidemia  Need updated labs.  should be improving.    4. Vitamin D deficiency  On supplement.    5. Hair loss  Occasional hot flashes in the past 8 months.  Hysterectomy since 2011.   Discussed hair loss could be from weight loss or perimenopausal.  Recommended adequate intake of protein around 80 g/day. She is taking biotin containing supplements.      Return in about 3 months (around 3/12/2024).           Signed by: Celsa Gaspar M.D.

## 2023-12-13 ENCOUNTER — HOSPITAL ENCOUNTER (OUTPATIENT)
Dept: LAB | Facility: MEDICAL CENTER | Age: 53
End: 2023-12-13
Attending: INTERNAL MEDICINE
Payer: COMMERCIAL

## 2023-12-13 DIAGNOSIS — E78.2 MIXED HYPERLIPIDEMIA: ICD-10-CM

## 2023-12-13 DIAGNOSIS — R79.89 LOW VITAMIN D LEVEL: ICD-10-CM

## 2023-12-13 DIAGNOSIS — R73.01 FASTING HYPERGLYCEMIA: ICD-10-CM

## 2023-12-13 LAB
EST. AVERAGE GLUCOSE BLD GHB EST-MCNC: 134 MG/DL
HBA1C MFR BLD: 6.3 % (ref 4–5.6)

## 2023-12-13 PROCEDURE — 80053 COMPREHEN METABOLIC PANEL: CPT

## 2023-12-13 PROCEDURE — 82306 VITAMIN D 25 HYDROXY: CPT

## 2023-12-13 PROCEDURE — 80061 LIPID PANEL: CPT

## 2023-12-13 PROCEDURE — 83036 HEMOGLOBIN GLYCOSYLATED A1C: CPT

## 2023-12-13 PROCEDURE — 36415 COLL VENOUS BLD VENIPUNCTURE: CPT

## 2023-12-14 LAB
25(OH)D3 SERPL-MCNC: 39 NG/ML (ref 30–100)
ALBUMIN SERPL BCP-MCNC: 4.4 G/DL (ref 3.2–4.9)
ALBUMIN/GLOB SERPL: 1.5 G/DL
ALP SERPL-CCNC: 73 U/L (ref 30–99)
ALT SERPL-CCNC: 22 U/L (ref 2–50)
ANION GAP SERPL CALC-SCNC: 10 MMOL/L (ref 7–16)
AST SERPL-CCNC: 21 U/L (ref 12–45)
BILIRUB SERPL-MCNC: 0.4 MG/DL (ref 0.1–1.5)
BUN SERPL-MCNC: 18 MG/DL (ref 8–22)
CALCIUM ALBUM COR SERPL-MCNC: 9.1 MG/DL (ref 8.5–10.5)
CALCIUM SERPL-MCNC: 9.4 MG/DL (ref 8.5–10.5)
CHLORIDE SERPL-SCNC: 105 MMOL/L (ref 96–112)
CHOLEST SERPL-MCNC: 247 MG/DL (ref 100–199)
CO2 SERPL-SCNC: 26 MMOL/L (ref 20–33)
CREAT SERPL-MCNC: 0.76 MG/DL (ref 0.5–1.4)
FASTING STATUS PATIENT QL REPORTED: NORMAL
GFR SERPLBLD CREATININE-BSD FMLA CKD-EPI: 94 ML/MIN/1.73 M 2
GLOBULIN SER CALC-MCNC: 3 G/DL (ref 1.9–3.5)
GLUCOSE SERPL-MCNC: 125 MG/DL (ref 65–99)
HDLC SERPL-MCNC: 35 MG/DL
LDLC SERPL CALC-MCNC: 181 MG/DL
POTASSIUM SERPL-SCNC: 4.2 MMOL/L (ref 3.6–5.5)
PROT SERPL-MCNC: 7.4 G/DL (ref 6–8.2)
SODIUM SERPL-SCNC: 141 MMOL/L (ref 135–145)
TRIGL SERPL-MCNC: 157 MG/DL (ref 0–149)

## 2023-12-15 ENCOUNTER — APPOINTMENT (OUTPATIENT)
Dept: MEDICAL GROUP | Facility: MEDICAL CENTER | Age: 53
End: 2023-12-15
Payer: COMMERCIAL

## 2023-12-22 ENCOUNTER — APPOINTMENT (OUTPATIENT)
Dept: MEDICAL GROUP | Facility: MEDICAL CENTER | Age: 53
End: 2023-12-22
Payer: COMMERCIAL

## 2023-12-26 ENCOUNTER — TELEMEDICINE (OUTPATIENT)
Dept: MEDICAL GROUP | Facility: MEDICAL CENTER | Age: 53
End: 2023-12-26
Payer: COMMERCIAL

## 2023-12-26 VITALS — HEIGHT: 69 IN | WEIGHT: 183 LBS | BODY MASS INDEX: 27.11 KG/M2

## 2023-12-26 DIAGNOSIS — E66.9 CLASS 2 OBESITY: ICD-10-CM

## 2023-12-26 DIAGNOSIS — E78.2 MIXED HYPERLIPIDEMIA: ICD-10-CM

## 2023-12-26 DIAGNOSIS — Z00.00 WELL ADULT EXAM: ICD-10-CM

## 2023-12-26 DIAGNOSIS — R73.03 PREDIABETES: ICD-10-CM

## 2023-12-26 PROCEDURE — 99214 OFFICE O/P EST MOD 30 MIN: CPT | Performed by: INTERNAL MEDICINE

## 2023-12-26 RX ORDER — METFORMIN HYDROCHLORIDE 500 MG/1
500 TABLET, EXTENDED RELEASE ORAL DAILY
Qty: 90 TABLET | Refills: 1 | Status: SHIPPED | OUTPATIENT
Start: 2023-12-26

## 2023-12-26 ASSESSMENT — FIBROSIS 4 INDEX: FIB4 SCORE: 0.76

## 2023-12-26 NOTE — PROGRESS NOTES
"Virtual Visit: Established Patient   This visit was conducted via Zoom using secure and encrypted videoconferencing technology. The patient was in a private location in the state of [place].    The patient's identity was confirmed and verbal consent was obtained for this virtual visit.    Subjective:   CC:   Chief Complaint   Patient presents with    Lab Results       Ariana Ferris is a 53 y.o. female presenting for evaluation and management of above:    Despite successful weight loss with phentermine and lifestyle modification, >60 lbs loss in 6 months, her labs came back with slightly worsening prediabetes and mixed response with hyperlipidemia.    Family history is unknown due to her being adopted. Her lifestyle currently is optimal.  She does not drink alcohol, eat any high sugar containing food or drinks.      ROS    As above    No Known Allergies    Current medicines (including changes today)  Current Outpatient Medications   Medication Sig Dispense Refill    metFORMIN ER (GLUCOPHAGE XR) 500 MG TABLET SR 24 HR Take 1 Tablet by mouth every day. 90 Tablet 1    phentermine (ADIPEX-P) 37.5 MG tablet Take 1 Tablet by mouth every morning before breakfast for 90 days. 90 Tablet 0    meloxicam (MOBIC) 15 MG tablet Take 1 Tablet by mouth every day. 30 Tablet 0     No current facility-administered medications for this visit.       Patient Active Problem List    Diagnosis Date Noted    COVID-19 08/31/2023    History of cervical cancer 03/28/2023    H/O total hysterectomy 03/28/2023    Vertigo 03/28/2023    History of kidney stones 02/09/2018    Chronic pain of both knees 12/19/2017    History of pulmonary embolus (PE) 12/19/2017    Allergic arthritis 12/05/2017          Objective:   Ht 1.753 m (5' 9\")   Wt 83 kg (183 lb)   BMI 27.02 kg/m²     Physical Exam:   Constitutional: Alert, no distress, well-groomed.  Skin: No rashes in visible areas.  Eye: Round. Conjunctiva clear, lids normal. No icterus.   ENMT: Lips pink " without lesions, moist mucous membranes. Phonation normal.  Neck: No visible masses   Respiratory: Unlabored respiratory effort, no cough or audible wheeze  Psych: Alert and oriented x3, normal affect and mood.       Assessment and Plan:   The following treatment plan was discussed:     1. Prediabetes  Given lack of response to weight loss, recommended low-dose metformin for insulin resistance likely from genetic or chronic stress or combined. Check with dietitian for detail diet review /modification.  Recheck labs in 3 months.  - metFORMIN ER (GLUCOPHAGE XR) 500 MG TABLET SR 24 HR; Take 1 Tablet by mouth every day.  Dispense: 90 Tablet; Refill: 1  - HEMOGLOBIN A1C; Future    2. Mixed hyperlipidemia  Mixed response with improvement in triglyceride, worsening LDL.  Recommended to cut down red meat to once weekly from 2-3 times weekly. The 10-year ASCVD risk score (Darlene WONG, et al., 2019) is: 2.4%  Avoid statin for now.  Reevaluate in 3 months.  - Lipid Profile; Future  - Comp Metabolic Panel; Future    3. Class 2 obesity  - TSH WITH REFLEX TO FT4; Future    4. Well adult exam  - CBC WITH DIFFERENTIAL; Future  - TSH WITH REFLEX TO FT4; Future        Follow-up: Return in about 3 months (around 3/26/2024).

## 2023-12-29 ENCOUNTER — APPOINTMENT (OUTPATIENT)
Dept: MEDICAL GROUP | Facility: MEDICAL CENTER | Age: 53
End: 2023-12-29
Payer: COMMERCIAL

## 2024-06-11 ENCOUNTER — APPOINTMENT (OUTPATIENT)
Dept: MEDICAL GROUP | Facility: MEDICAL CENTER | Age: 54
End: 2024-06-11
Payer: COMMERCIAL

## 2024-06-18 ENCOUNTER — HOSPITAL ENCOUNTER (OUTPATIENT)
Dept: LAB | Facility: MEDICAL CENTER | Age: 54
End: 2024-06-18
Attending: INTERNAL MEDICINE
Payer: COMMERCIAL

## 2024-06-18 DIAGNOSIS — E66.9 CLASS 2 OBESITY: ICD-10-CM

## 2024-06-18 DIAGNOSIS — R73.03 PREDIABETES: ICD-10-CM

## 2024-06-18 DIAGNOSIS — Z00.00 WELL ADULT EXAM: ICD-10-CM

## 2024-06-18 DIAGNOSIS — E78.2 MIXED HYPERLIPIDEMIA: ICD-10-CM

## 2024-06-18 LAB
ALBUMIN SERPL BCP-MCNC: 4.6 G/DL (ref 3.2–4.9)
ALBUMIN/GLOB SERPL: 1.5 G/DL
ALP SERPL-CCNC: 77 U/L (ref 30–99)
ALT SERPL-CCNC: 23 U/L (ref 2–50)
ANION GAP SERPL CALC-SCNC: 12 MMOL/L (ref 7–16)
AST SERPL-CCNC: 28 U/L (ref 12–45)
BASOPHILS # BLD AUTO: 0.5 % (ref 0–1.8)
BASOPHILS # BLD: 0.04 K/UL (ref 0–0.12)
BILIRUB SERPL-MCNC: 0.6 MG/DL (ref 0.1–1.5)
BUN SERPL-MCNC: 21 MG/DL (ref 8–22)
CALCIUM ALBUM COR SERPL-MCNC: 9.4 MG/DL (ref 8.5–10.5)
CALCIUM SERPL-MCNC: 9.9 MG/DL (ref 8.5–10.5)
CHLORIDE SERPL-SCNC: 102 MMOL/L (ref 96–112)
CHOLEST SERPL-MCNC: 276 MG/DL (ref 100–199)
CO2 SERPL-SCNC: 25 MMOL/L (ref 20–33)
CREAT SERPL-MCNC: 0.68 MG/DL (ref 0.5–1.4)
EOSINOPHIL # BLD AUTO: 0.1 K/UL (ref 0–0.51)
EOSINOPHIL NFR BLD: 1.4 % (ref 0–6.9)
ERYTHROCYTE [DISTWIDTH] IN BLOOD BY AUTOMATED COUNT: 41.4 FL (ref 35.9–50)
EST. AVERAGE GLUCOSE BLD GHB EST-MCNC: 126 MG/DL
GFR SERPLBLD CREATININE-BSD FMLA CKD-EPI: 104 ML/MIN/1.73 M 2
GLOBULIN SER CALC-MCNC: 3 G/DL (ref 1.9–3.5)
GLUCOSE SERPL-MCNC: 106 MG/DL (ref 65–99)
HBA1C MFR BLD: 6 % (ref 4–5.6)
HCT VFR BLD AUTO: 47.5 % (ref 37–47)
HDLC SERPL-MCNC: 65 MG/DL
HGB BLD-MCNC: 15.9 G/DL (ref 12–16)
IMM GRANULOCYTES # BLD AUTO: 0.02 K/UL (ref 0–0.11)
IMM GRANULOCYTES NFR BLD AUTO: 0.3 % (ref 0–0.9)
LDLC SERPL CALC-MCNC: 190 MG/DL
LYMPHOCYTES # BLD AUTO: 3.43 K/UL (ref 1–4.8)
LYMPHOCYTES NFR BLD: 46.4 % (ref 22–41)
MCH RBC QN AUTO: 30.2 PG (ref 27–33)
MCHC RBC AUTO-ENTMCNC: 33.5 G/DL (ref 32.2–35.5)
MCV RBC AUTO: 90.3 FL (ref 81.4–97.8)
MONOCYTES # BLD AUTO: 0.5 K/UL (ref 0–0.85)
MONOCYTES NFR BLD AUTO: 6.8 % (ref 0–13.4)
NEUTROPHILS # BLD AUTO: 3.31 K/UL (ref 1.82–7.42)
NEUTROPHILS NFR BLD: 44.6 % (ref 44–72)
NRBC # BLD AUTO: 0 K/UL
NRBC BLD-RTO: 0 /100 WBC (ref 0–0.2)
PLATELET # BLD AUTO: 317 K/UL (ref 164–446)
PMV BLD AUTO: 9.6 FL (ref 9–12.9)
POTASSIUM SERPL-SCNC: 4.6 MMOL/L (ref 3.6–5.5)
PROT SERPL-MCNC: 7.6 G/DL (ref 6–8.2)
RBC # BLD AUTO: 5.26 M/UL (ref 4.2–5.4)
SODIUM SERPL-SCNC: 139 MMOL/L (ref 135–145)
TRIGL SERPL-MCNC: 107 MG/DL (ref 0–149)
TSH SERPL DL<=0.005 MIU/L-ACNC: 2.03 UIU/ML (ref 0.38–5.33)
WBC # BLD AUTO: 7.4 K/UL (ref 4.8–10.8)

## 2024-06-18 PROCEDURE — 83036 HEMOGLOBIN GLYCOSYLATED A1C: CPT

## 2024-06-18 PROCEDURE — 80061 LIPID PANEL: CPT

## 2024-06-18 PROCEDURE — 85025 COMPLETE CBC W/AUTO DIFF WBC: CPT

## 2024-06-18 PROCEDURE — 84443 ASSAY THYROID STIM HORMONE: CPT

## 2024-06-18 PROCEDURE — 80053 COMPREHEN METABOLIC PANEL: CPT

## 2024-06-18 PROCEDURE — 36415 COLL VENOUS BLD VENIPUNCTURE: CPT

## 2024-06-19 ENCOUNTER — APPOINTMENT (OUTPATIENT)
Dept: MEDICAL GROUP | Facility: MEDICAL CENTER | Age: 54
End: 2024-06-19
Payer: COMMERCIAL

## 2024-06-19 VITALS
DIASTOLIC BLOOD PRESSURE: 62 MMHG | SYSTOLIC BLOOD PRESSURE: 98 MMHG | BODY MASS INDEX: 27.33 KG/M2 | HEART RATE: 68 BPM | TEMPERATURE: 98.1 F | OXYGEN SATURATION: 97 % | RESPIRATION RATE: 16 BRPM | WEIGHT: 185.1 LBS

## 2024-06-19 DIAGNOSIS — E66.9 CLASS 2 OBESITY: ICD-10-CM

## 2024-06-19 DIAGNOSIS — Z12.11 COLON CANCER SCREENING: ICD-10-CM

## 2024-06-19 DIAGNOSIS — E78.2 MIXED HYPERLIPIDEMIA: ICD-10-CM

## 2024-06-19 DIAGNOSIS — R73.03 PREDIABETES: ICD-10-CM

## 2024-06-19 PROCEDURE — 3074F SYST BP LT 130 MM HG: CPT | Performed by: INTERNAL MEDICINE

## 2024-06-19 PROCEDURE — 3078F DIAST BP <80 MM HG: CPT | Performed by: INTERNAL MEDICINE

## 2024-06-19 PROCEDURE — 99214 OFFICE O/P EST MOD 30 MIN: CPT | Performed by: INTERNAL MEDICINE

## 2024-06-19 RX ORDER — PHENTERMINE HYDROCHLORIDE 37.5 MG/1
37.5 TABLET ORAL
Qty: 90 TABLET | Refills: 0 | Status: SHIPPED | OUTPATIENT
Start: 2024-06-19 | End: 2024-09-17

## 2024-06-19 ASSESSMENT — FIBROSIS 4 INDEX: FIB4 SCORE: 0.96

## 2024-06-19 ASSESSMENT — PATIENT HEALTH QUESTIONNAIRE - PHQ9: CLINICAL INTERPRETATION OF PHQ2 SCORE: 0

## 2024-06-19 NOTE — PROGRESS NOTES
Established Patient    Ariana presents today with the following:    CC: Follow-up for chronic medical problems    HPI:   Ariana is a 53 y.o. female who came in for above.    She is feeling fine today.  She cannot tolerate stopping phentermine abruptly back in April.  She taper off and she did okay. She has been on road trip for the past 8 weeks. She gained 6 lbs with more liberal eating on the trip. She currently does not plan to go back to find a new job. Therefore, she has more time for her well being.      ROS:   As above    Patient Active Problem List    Diagnosis Date Noted    COVID-19 08/31/2023    History of cervical cancer 03/28/2023    H/O total hysterectomy 03/28/2023    Vertigo 03/28/2023    History of kidney stones 02/09/2018    Chronic pain of both knees 12/19/2017    History of pulmonary embolus (PE) 12/19/2017    Allergic arthritis 12/05/2017       Current Outpatient Medications   Medication Sig Dispense Refill    phentermine (ADIPEX-P) 37.5 MG tablet Take 1 Tablet by mouth every morning before breakfast for 90 days. 90 Tablet 0    metFORMIN ER (GLUCOPHAGE XR) 500 MG TABLET SR 24 HR Take 1 Tablet by mouth every day. 90 Tablet 1    meloxicam (MOBIC) 15 MG tablet Take 1 Tablet by mouth every day. 30 Tablet 0     No current facility-administered medications for this visit.         BP 98/62   Pulse 68   Temp 36.7 °C (98.1 °F) (Temporal)   Resp 16   Wt 84 kg (185 lb 1.6 oz)   SpO2 97%   BMI 27.33 kg/m²     Physical Exam  General: Alert and oriented, No apparent distress.  Neck: Supple. No cervical or supraclavicular lymphadenopathy noted. Thyroid not enlarged.  Lungs: Clear to auscultation bilaterally without any wheezing, crepitations.  Cardiovascular: Regular rate and rhythm. No murmurs, rubs or gallops.  Abdomen: Bowel sound +, soft, non tender, no rebound or guarding, no palpable organomegaly  Extremities: No edema.      Assessment and Plan    1. Class 2 obesity  Her goal weight is around 160  lbs. She had great response with phentermine and lifestyle modification. Pre phentermine weight last year was 248 lbs. She was able to maintain most of the weight loss when she had a gap in insurance without phentermine in the last 2 months.  She is willing to go back on it to get to her target. Discussed healthy lifestyle to achieve the goal and congestion with phentermine as pharmacotherapy alone will not be able to achieve that.  Restart phentermine and reevaluate in 3 months.   reviewed.  - phentermine (ADIPEX-P) 37.5 MG tablet; Take 1 Tablet by mouth every morning before breakfast for 90 days.  Dispense: 90 Tablet; Refill: 0    2. Prediabetes  Controlled with metformin.  - HEMOGLOBIN A1C; Future    3. Mixed hyperlipidemia  LDL significantly high at this time.  Discussed dietary modification.  Recheck in 3 months.  - Lipid Profile; Future  - Comp Metabolic Panel; Future    4. Colon cancer screening  - Referral to GI for Colonoscopy        Return in about 3 months (around 9/19/2024).        Signed by: Celsa Gaspar M.D.

## 2024-09-25 ENCOUNTER — APPOINTMENT (OUTPATIENT)
Dept: RADIOLOGY | Facility: HOSPITAL | Age: 54
End: 2024-09-25
Payer: COMMERCIAL

## 2024-09-25 ENCOUNTER — HOSPITAL ENCOUNTER (EMERGENCY)
Facility: HOSPITAL | Age: 54
Discharge: HOME | End: 2024-09-25
Attending: FAMILY MEDICINE
Payer: COMMERCIAL

## 2024-09-25 VITALS
DIASTOLIC BLOOD PRESSURE: 91 MMHG | OXYGEN SATURATION: 95 % | BODY MASS INDEX: 26.36 KG/M2 | SYSTOLIC BLOOD PRESSURE: 159 MMHG | WEIGHT: 178 LBS | HEART RATE: 86 BPM | TEMPERATURE: 98.2 F | HEIGHT: 69 IN

## 2024-09-25 DIAGNOSIS — M54.16 LUMBAR RADICULOPATHY: Primary | ICD-10-CM

## 2024-09-25 PROCEDURE — 99283 EMERGENCY DEPT VISIT LOW MDM: CPT | Performed by: FAMILY MEDICINE

## 2024-09-25 PROCEDURE — 6360000200 HC RX 636 W HCPCS (ALT 250 FOR IP): Performed by: FAMILY MEDICINE

## 2024-09-25 PROCEDURE — 99284 EMERGENCY DEPT VISIT MOD MDM: CPT | Performed by: FAMILY MEDICINE

## 2024-09-25 PROCEDURE — 6370000100 HC RX 637 (ALT 250 FOR IP): Performed by: FAMILY MEDICINE

## 2024-09-25 PROCEDURE — 96372 THER/PROPH/DIAG INJ SC/IM: CPT

## 2024-09-25 PROCEDURE — 72100 X-RAY EXAM L-S SPINE 2/3 VWS: CPT

## 2024-09-25 RX ORDER — METHYLPREDNISOLONE 4 MG/1
TABLET ORAL
Qty: 21 TABLET | Refills: 0 | Status: SHIPPED | OUTPATIENT
Start: 2024-09-25 | End: 2024-10-01

## 2024-09-25 RX ORDER — GABAPENTIN 100 MG/1
300 CAPSULE ORAL 3 TIMES DAILY
Qty: 270 CAPSULE | Refills: 0 | Status: SHIPPED | OUTPATIENT
Start: 2024-09-25 | End: 2025-09-25

## 2024-09-25 RX ORDER — IBUPROFEN 800 MG/1
200 TABLET ORAL AS NEEDED
COMMUNITY

## 2024-09-25 RX ORDER — GABAPENTIN 300 MG/1
300 CAPSULE ORAL 3 TIMES DAILY
Status: COMPLETED | OUTPATIENT
Start: 2024-09-25 | End: 2024-09-25

## 2024-09-25 RX ORDER — TRIAMCINOLONE ACETONIDE 40 MG/ML
60 INJECTION, SUSPENSION INTRA-ARTICULAR; INTRAMUSCULAR ONCE
Status: COMPLETED | OUTPATIENT
Start: 2024-09-25 | End: 2024-09-25

## 2024-09-25 RX ADMIN — GABAPENTIN 300 MG: 300 CAPSULE ORAL at 18:15

## 2024-09-25 RX ADMIN — TRIAMCINOLONE ACETONIDE 60 MG: 40 INJECTION, SUSPENSION INTRA-ARTICULAR; INTRAMUSCULAR at 18:56

## 2024-09-25 ASSESSMENT — ENCOUNTER SYMPTOMS
NUMBNESS: 1
BACK PAIN: 1
ARTHRALGIAS: 0
FEVER: 0
WEAKNESS: 0
MYALGIAS: 0
VOMITING: 0
ABDOMINAL PAIN: 0
FATIGUE: 0
SLEEP DISTURBANCE: 0
NAUSEA: 0
WOUND: 0

## 2024-09-25 NOTE — ED PROVIDER NOTES
HPI:  Chief Complaint   Patient presents with    Back Pain     RIGHT SIDED BACK PAIN X 2 WEEKS, RADIATES INTO RLE       HPI    The patient, with a history of a herniated disc, presents with worsening lower back pain radiating into the right buttock, thigh, and foot, accompanied by numbness. The symptoms began approximately two weeks ago and have not improved despite attempts at self-management, including massage and chiropractic care.  The pain is most severe when sitting and in the morning, making it difficult for the patient to bend over and tie their shoes. However, the pain seems to lessen throughout the day after stretching and is less bothersome when standing or walking. The patient has been taking over-the-counter ibuprofen multiple times a day, but this has caused stomach upset. They also had a cortisone injection in their knee prior to the onset of the current symptoms. The patient has been traveling and is due to continue for another four weeks. The pain is described as sharp and sometimes radiates down the leg. The patient also reports a trigger point in the right buttock that is particularly sore.  She has some mild numbness over the anterior aspect of the right leg extending over the knee and potentially involving the foot.  A recent visit to a chiropractor for an adjustment did not alleviate the symptoms. The patient also notes increased pressure pain in the back when bearing down, such as during bowel movements.     HISTORY:  No past medical history on file.    No past surgical history on file.    No family history on file.    Social History     Tobacco Use    Smoking status: Never    Smokeless tobacco: Never   Vaping Use    Vaping status: Never Used   Substance Use Topics    Alcohol use: Never    Drug use: Yes     Types: Marijuana     Comment: LAST 3 WEEKS AGO         ROS:  Review of Systems   Constitutional:  Negative for fatigue and fever.   Gastrointestinal:  Negative for abdominal pain,  nausea and vomiting.   Musculoskeletal:  Positive for back pain. Negative for arthralgias and myalgias.   Skin:  Negative for rash and wound.   Allergic/Immunologic: Negative for immunocompromised state.   Neurological:  Positive for numbness. Negative for weakness.   Psychiatric/Behavioral:  Negative for sleep disturbance.        PE:  ED Triage Vitals   Temp Heart Rate Resp BP SpO2   09/25/24 1910 09/25/24 1715 -- 09/25/24 1715 09/25/24 1715   36.8 °C (98.2 °F) 96  136/89 97 %      Mean BP (mmHg) Temp src Heart Rate Source Patient Position BP Location   09/25/24 1715 -- -- -- --   108          FiO2 (%)       --                  Physical Exam  Vitals reviewed.   Constitutional:       Appearance: Normal appearance.   HENT:      Head: Normocephalic and atraumatic.      Nose: Nose normal.      Mouth/Throat:      Mouth: Mucous membranes are moist.   Eyes:      Extraocular Movements: Extraocular movements intact.      Conjunctiva/sclera: Conjunctivae normal.      Pupils: Pupils are equal, round, and reactive to light.   Musculoskeletal:         General: No swelling or tenderness.        Back:       Comments: Tenderness on the posterior aspect of the right [place]   Skin:     General: Skin is warm and dry.   Neurological:      Mental Status: She is alert and oriented to person, place, and time.      GCS: GCS eye subscore is 4. GCS verbal subscore is 5. GCS motor subscore is 6.      Cranial Nerves: Cranial nerves 2-12 are intact.      Sensory: Sensory deficit present.      Motor: No weakness, tremor, atrophy or abnormal muscle tone.      Coordination: Coordination is intact.      Deep Tendon Reflexes:      Reflex Scores:       Patellar reflexes are 1+ on the right side and 1+ on the left side.       Achilles reflexes are 1+ on the right side and 1+ on the left side.     Comments: Mild numbness over the anterior aspect of the right thigh   Psychiatric:         Mood and Affect: Mood normal.         ED LABS:  Labs Reviewed - No  data to display      ED IMAGES:  X-ray lumbar spine 2 or 3 views   Final Result   IMPRESSION:        1.  No acute osseous abnormality.          ED PROCEDURES:  Procedures    ED COURSE:       The patient is a 54-year-old female presenting due to right leg pain.  Patient is hemodynamically stable upon arrival, elevation in blood pressure noted.  The patient  reporting radicular symptoms on the right leg have been stable x 2 weeks but not improving.  Patient is unable to sit secondary to pain.  On examination the patient's symptoms are exacerbated by flexion of the hip and improved with extension of the hip.  X-ray of the back notes no pathology.  The patient's neuroexam reveals some mild numbness over the anterior aspect of the right thigh but no motor deficit.  Patient has intact vasculature bilaterally as well.  The patient symptoms seem most consistent with a lumbar radiculopathy.  She has no warning signs such as acute back pain fever etc.  The patient was given gabapentin and will start on  Medrol dose pack.  Expectant management reviewed with the patient who is still traveling.  Did discuss potential stretches versus continued movement.  Warning signs reviewed and handout given.  Patient voiced understanding she is in agreement with the plan.  She is discharged in stable condition to follow-up with primary care clinic.        MDM:  Medical Decision Making  Amount and/or Complexity of Data Reviewed  Radiology: ordered. Decision-making details documented in ED Course.    Risk  Prescription drug management.        Final diagnoses:   [M54.16] Lumbar radiculopathy     9/25/2024  6:56 PM                                        Oj Guzmán MD  09/25/24 1954

## 2024-09-26 NOTE — DISCHARGE INSTRUCTIONS
You can take up to 3 tablets of the gabapentin up to 3 times a day.  Side effects include slight confusion or cloudy thought.  You can also get some sedation within 2.  In the event that you find this to be the case you can decrease to 200 mg 3 times a day or 100 mg 3 times a day.  This will help with the burning pain radiating down your leg, however will not take away the numbness.  In the event you have increasing numbness, new onset weakness of your right leg or any new symptoms such as a fever that would bring you back immediately.  Will give you Medrol to help with inflammation, stop taking ibuprofen, Aleve, naproxen etc. while taking the Medrol Dosepak.  Make sure you take it with food as the side effects on your stomach can be very similar to taking ibuprofen.  This will decrease the inflammation with a herniated disc and help resolve some of your symptoms as well quicker.